# Patient Record
Sex: FEMALE | URBAN - METROPOLITAN AREA
[De-identification: names, ages, dates, MRNs, and addresses within clinical notes are randomized per-mention and may not be internally consistent; named-entity substitution may affect disease eponyms.]

---

## 2017-06-14 ENCOUNTER — APPOINTMENT (RX ONLY)
Dept: URBAN - METROPOLITAN AREA SURGERY 2 | Facility: SURGERY | Age: 47
Setting detail: DERMATOLOGY
End: 2017-06-14

## 2017-06-14 DIAGNOSIS — Z41.1 ENCOUNTER FOR COSMETIC SURGERY: ICD-10-CM

## 2017-06-14 PROCEDURE — ? CONSULTATION - AGING FACE

## 2017-06-14 PROCEDURE — ? BOTOX

## 2017-06-14 PROCEDURE — ? PATIENT SPECIFIC COUNSELING

## 2017-06-14 NOTE — PROCEDURE: PATIENT SPECIFIC COUNSELING
Detail Level: Simple
Other (Free Text): Discussed with patient that she can do a series of 2-3 light peels spaced out a month apart, to remove the brown spots overtime. Explained to patient that her downtime for a light peel would be 3-4 days. \\nDiscussed with patient that if she would want to only have one downtime, a Medium depth/ TCA peel could be done. \\nInformed patient that her recovery time would be 7-10 days. \\nExplained to patient that before a medium depth were to be done, she would have to treat her skin with Retin-A/ Hydroquinone for at least 2 weeks. Discussed with patient that she would stop the topical 3 days before peel, to help avoid too much irritation. \\nInformed patient that before a medium depth peel treatment, she would come into the office 30 minutes- 1 hour before treatment to take a pill for relaxation and numbing cream would be applied to her face. \\nDiscussed with patient that she will be red from anywhere between 1-3 weeks while healing, and she can cover her face with makeup if needed. \\nInformed patient that if she chose the medium depth peel she would have to avoid the sun while she is in the healing process.

## 2017-06-14 NOTE — PROCEDURE: BOTOX
Forehead Units: 15
Dilution (U/0.1 Cc): 5
Perioral Units: 0
Administered By (Optional): Lauren
Expiration Date (Month Year): January 2020
Show Price In Note?: yes
Price Per Unit In $ (Use Numbers Only, No Text Please.): 17
Additional Area 1 Location: Upper lip
Additional Area 2 Location: Massester muscle
Postcare Instructions: Patient instructed to not lie down for 4 hours and limit physical activity for 24 hours. Patient instructed not to travel by airplane for 48 hours.
Map Statment: See attached map for complete details
Bill Summary Price Listed Below, Or Bill Total Of Units X Price Per Unit?: Bill #Units x Price Per Unit
Lot #: [id number]
Consent: Written consent was obtained prior to the procedure. Risks, benefits, expectations and alternatives were discussed including, but not limited to, infection, bleeding, lid/brow ptosis, bruising, swelling, diplopia, temporary effects, incomplete chemical denervation and dissatisfaction with the cosmetic outcome. No guarantee or warranty was given or implied regarding longevity of results.
Use Map Statement For Sites (Optional): No
Detail Level: Detailed